# Patient Record
Sex: MALE | Race: BLACK OR AFRICAN AMERICAN | NOT HISPANIC OR LATINO | URBAN - METROPOLITAN AREA
[De-identification: names, ages, dates, MRNs, and addresses within clinical notes are randomized per-mention and may not be internally consistent; named-entity substitution may affect disease eponyms.]

---

## 2017-03-02 ENCOUNTER — OFFICE VISIT - HEALTHEAST (OUTPATIENT)
Dept: ADDICTION MEDICINE | Facility: CLINIC | Age: 37
End: 2017-03-02

## 2017-03-02 DIAGNOSIS — Z03.89 NO DIAGNOSIS ON AXIS I: ICD-10-CM

## 2017-03-10 ENCOUNTER — COMMUNICATION - HEALTHEAST (OUTPATIENT)
Dept: ADDICTION MEDICINE | Facility: CLINIC | Age: 37
End: 2017-03-10

## 2021-06-02 ENCOUNTER — RECORDS - HEALTHEAST (OUTPATIENT)
Dept: ADMINISTRATIVE | Facility: CLINIC | Age: 41
End: 2021-06-02

## 2021-06-09 NOTE — PROGRESS NOTES
Rule 25 Assessment  Background Information   1. Date of Assessment Request  2. Date of Assessment  3/2/17 3. Date Service Authorized     4.   DAT Naik  5.  Phone Number [phone]  6. Referent  [facility] probation  7. Assessment Site  [facility]     8. Client Name Pj Costello 9. Date of Birth  1980 Age  36 y.o. 10. Gender  male  11. PMI/ Insurance No.     12. Client's Primary Language:  English 13. Do you require special accommodations, such as an  or assistance with written material? No   14. Current Address: [address] [place] [address]   15. Client Phone Numbers: [phone] (home)    16.  Alternate (cell) Phone Number:       17. Tell me what has happened to bring you here today.     Client arrived to an outpatient chemical health assessment.   Client reports currently being on probation, states that they requested he complete another assessment.   Client reports having an alcohol related case was to complete a chemical health assessment.       18. Have you had other rule 25 assessments? yes, when, where, and what circumstances:  2015, [facility], no recommendation.     DIMENSION I - Acute Intoxication /Withdrawal Potential   1. Chemical use most recent 12 months outside a facility and other significant use history (client self-report)             X = Primary Drug Used   Age of First Use Most Recent Pattern of Use and Duration   Need enough information to show pattern (both frequency and amounts) and to show tolerance for each chemical that has a diagnosis   Date of last use and time, if needed   Withdrawal Potential? Requiring special care Method of use  (oral, smoked, snort, IV, etc)   [x] Alcohol 23 Every few months, 1-2 beers; reports at time of incident had had 6 beers  08/2016 none oral   [] Marijuana/Hashish  Denies use      [] Cocaine/Crack  Denies use      [] Meth/Amphetamines  Denies use      [] Heroin  Denies use       [] Other Opiates/Synthetics  Denies use      [] Inhalants  Denies use      [] Benzodiazepines  Denies use      [] Hallucinogens  Denies use      [] Barbiturates/Sedatives/Hypnotics  Denies use      [] Over-the-Counter Drugs  Denies use      [] Other  Denies use      [] Nicotine 19 2-3 cigaretters per day  3/2/17  smoke     2. Do you use greater amounts of alcohol/other drugs to feel intoxicated or achieve the desired effect? no.  Or use the same amount and get less of an effect? No (DSM)  Example: client denies tolerance    3A. Have you ever been to detox? no    3B. When was the first time? N/A     3C. How many times since then? N/A     3D. Date of most recent detox: N/A       4.  Withdrawal symptoms: Have you had any of the following withdrawal symptoms?  no  Past 12 months Recent (past 30 days)   None None     Notes:      's Visual Observations and Symptoms: calm and cooperative, oriented 3x  Based on the above information, is withdrawal likely to require attention as part of treatment participation?  no    Dimension I Ratings   Acute intoxication/Withdrawal potential - The placing authority must use the criteria in Dimension I to determine a client s acute intoxication and withdrawal potential.    RISK DESCRIPTIONS - Severity ratin  Client displays full functioning with good ability to tolerate and cope with withdrawal discomfort.  No signs or symptoms of intoxication or withdrawal or resolving signs or symptoms    REASONS SEVERITY WAS ASSIGNED (What about the amount of the person s use and date of most recent use and history of withdrawal problems suggests the potential of withdrawal symptoms requiring professional assistance? )    Client reports date of last use of alcohol in 2016, but collateral indicates more recent use in 10/2016. Client reports most recent pattern of use has been 1-2 beers every few months. Client reports no withdrawal symptoms in the past year, and does not display any  withdrawal concerns at time of assessment.        DIMENSION II - Biomedical Complications and Conditions   1. Do you have any current health/medical conditions?(Include any infectious diseases, allergies, or chronic or acute pain, history of chronic conditions)    No current health concerns.     2. Do you have a health care provider? When was your most recent appointment? What concerns were identified?    [facility], last appt was 2016, physical     3. If indicated by answers to items 1 or 2: How do you deal with these concerns? Is that working for you? If you are not receiving care for this problem, why not?    N/A     4A. List current medication(s) including over-the-counter or herbal supplements--including pain management:    No current medications.     4B. Do you follow current medical recommendations/take medications as prescribed?  N/A     4C. When did you last take your medication?  N/A     5. Has a health care provider/healer ever recommended that you reduce or quit alcohol/drug use?  No (DSM)    6. Are you pregnant?  N/A       6B.  Receiving prenatal care? N/A       6C.  When is your baby due?  N/A     7. Have you had any injuries, assaults/violence towards you, accidents, health related issues, overdose(s) or hospitalizations related to your use of alcohol or other drugs:  no    8. Do you have any specific physical needs/accommodations? no    Dimension II Ratings   Biomedical Conditions and Complications - The placing authority must use the criteria in Dimension II to determine a client s biomedical conditions and complications.   RISK DESCRIPTIONS - Severity ratin  Client displays full functioning with good ability to cope with physical discomfort.    REASONS SEVERITY WAS ASSIGNED (What physical/medical problems does this person have that would inhibit his or her ability to participate in treatment? What issues does he or she have that require assistance to address?)    Client  reports no current physical health concerns, has a primary care physician.          DIMENSION III - Emotional, Behavioral, Cognitive Conditions and Complications   1. (Optional) Tell me what it was like growing up in your family. (substance use, mental health, discipline, abuse, support)    Reports no concerns.     2.  When was the last time that you had significant problems  Past month 2-12 months ago 1+ years ago Never   A. With feeling very trapped, lonely, sad, blue, depressed or hopeless about the future? []  [x]  [] []     B. With sleep trouble, such as bad dreams, sleeping restlessly, or falling asleep during the day? [] [] [] [x]   C. With feeling very anxious, nervous, tense, scared, panicked, or like something bad was going to happen? [] [] [] [x]   D. With becoming very distressed and upset when something reminded you of the past? [] [] [] [x]   E. With thinking about ending your life or committing suicide?  [] [] [] [x]     3.  When was the last time that you did the following things two or more times? Past month 2-12 months ago 1+ years ago Never   A. Lied or conned to get things you wanted or to avoid having to do something? [] [] [] [x]   B. Had a hard time paying attention at school, work, or home? [] [] [] [x]   C. Had a hard time listening to instructions at school, work, or home?  [] [] [] [x]   D. Were a bully or threatened other people? [] [] [] [x]   E. Started physical fights with other people? [] [] [] [x]     Note: These questions are from the Global Appraisal of Individual Needs--Short Screener. Any item marked  past month  or  2 to 12 months ago  will be scored with a severity rating of at least 2.  For each item that has occurred in the past month or past year ask follow up questions to determine how often the person has felt this way or has the behavior occurred? How recently? How has it affected their daily living? And, whether they were using or in withdrawal at the time?    2A: Death  of family members    4A. If the person has answered item 2E with  in the past year  or  the past month , ask about frequency and history of suicide in the family or someone close and whether they were under the influence.  Any history of suicide in your family? Or someone close to you?        4B. If the person answered item 2E  in the past month  ask about  intent, plan, means and access and any other follow-up information  to determine imminent risk. Document any actions taken to intervene  on any identified imminent risk.     Client denies any current SI/SIB     5A. Have you ever been diagnosed with a mental health problem?  Yes, PTSD, anxiety, depression--reports diagnoses were a long time ago, no current concerns   5B. Are you receiving care for any mental health issues? yes  If yes, what is the focus of that care or treatment?  Are you satisfied with the service?  Most recent appointment?  How has it been helpful?    JOANA, 1x per month, has been working with her 5 months, happy with services     6A.  Have you been prescribed medications for emotional/psychological problems?  no  6B.  Current mental health medication(s) If these medications are listed for Dimension II, reference item II-5.  6C.  Are you taking your medications as instructed? N/A     7A. Does your MH provider know about your use?  N/A   7B.  What does he or she have to say about it? (DSM)  N/A     8A. Have you ever been verbally, emotionally, physically or sexually abused? no   Follow up questions to learn current risk, continuing emotional impact.  N/A   8B. Have you received counseling for abuse?  N/A     9A. Have you ever experienced or been part of a group that experienced community violence, historical trauma, rape or assault? no  9B.  How has that affected you?  N/A   9C.  Have you received counseling for that? N/A     10A. [place]: no  10B.  Exposure to Combat?  no    11. Do you have problems with any of the following things in your daily  life?  Headaches and In relationships with others      Note: If the person has any of the above problems, how do they deal with them, have they developed coping mechanisms?  Have they received treatment?  Follow up with items 12, 13, and 14. If none of the issues in item 11 are a problem for the person, skip to item 15.    Will take over the counter medication for headaches.  When experiencing problems with other people, make boundaries, or stay away     12. Have you been diagnosed with traumatic brain injury or Alzheimer s?  no    13.  If the answer to #12 is no, ask the following questions:    Have you ever hit your head or been hit on the head? no    Were you ever seen in the Emergency Room, hospital, or by a doctor because of an injury to your head? no    Have you had any significant illness that affected your brain (brain tumor, meningitis, West Nile Virus, stroke or seizure, heart attack, near drowning or near suffocation)?  no    14.  If the answer to # 12 is yes, ask if any of the problems identified in #11 occurred since the head injury or loss of oxygen N/A     15A. Highest grade of school completed: Certificate at [employer]  15B. Do you have a learning disability? no  15C. Did you ever have tutoring in Math or English? no.  15D. Have you ever been diagnosed with Fetal Alcohol Effects or Fetal Alcohol Syndrome? no    Explain:      16. If yes to item 15 B, C, or D: How has this affected your use or been affected by your use?     N/A     Dimension III Ratings   Emotional/Behavioral/Cognitive - The placing authority must use the criteria in Dimension III to determine a client s emotional, behavioral, and cognitive conditions and complications.   RISK DESCRIPTIONS - Severity ratin  Client has good impulse control and coping skills and presents no risk of harm to self or others.  Client functions in all life areas and displays no emotional, behavioral. or cognitive problems or the  "problems are stable.    REASONS SEVERITY WAS ASSIGNED - What current issues might with thinking, feelings or behavior pose barriers to participation in a treatment program? What coping skills or other assets does the person have to offset those issues? Are these problems that can be initially accommodated by a treatment provider? If not, what specialized skills or attributes must a provider have?    Client reports history of mental health diagnoses of anxiety, depression and PTSD, but reports that those diagnoses were a long time ago, no current concerns. Client reports having a , happy with services. Client reports stressor in the past year related to relatives' deaths. Client denies any current SI/SIB.          DIMENSION IV - Readiness for Change   1. You ve told me what brought you here today. (first section) What do you think the problem really is? Updating assessment for probation.     2. Tell me how things are going. Ask enough questions to determine whether the person has use related problems or assets that can be built upon in the following areas: Family/friends/relationships; Legal; Financial; Emotional; Educational; Recreational/ leisure; Vocational/employment; Living arrangements (DSM)     \"Awesome\"  Will be off of probation shortly, on probation for disorderly conduct.   Has an apartment, lives alone.   Has kids, who live grandmother and mother, sees them on weekends.   Working full-time.     3. What activities have you engaged in when using alcohol/other drugs that could be hazardous to you or others (i.e. driving a car/motorcycle/boat, operating machinery, unsafe sex, sharing needles for drugs or tattoos, etc     Reports none     4. How much time do you spend getting, using or getting over using alcohol or drugs? (DSM)     Sporadic     5. Reasons for drinking/drug use (Use the space below to record answers. It may not be necessary to ask each item.)  Like the feeling    Trying to forget " problems    To cope with stress    To relieve physical pain    To cope with anxiety    To cope with depression    To relax or unwind    Makes it easier to talk with people    Partner encourages use    Most friends drink or use    To cope with family problems    Afraid of withdrawal symptoms/to feel better    Other (specify) Social      A. What concerns other people about your alcohol or drug use/Has anyone told you that you use too much? What did they say? (DSM)    No one has expressed concerns    B. What did you think about that/ do you think you have a problem with alcohol or drug use?     Isn't a problem.     6. What changes are you willing to make? What substance are you willing to stop using? How are you going to do that? Have you tried that before? What interfered with your success with that goal?     Doesn't plan to make any changes, plans to not get into an trouble in the future.     7. What would be helpful to you in making this change?     Nothing additional     Dimension IV Ratings   Readiness for Change - The placing authority must use the criteria in Dimension IV to determine a client s readiness for change.   RISK DESCRIPTIONS - Severity ratin  Clinet is motivated with active reinforcement, to explore treatment and strategies for change, but ambivalent about illness or need for change.    REASONS SEVERITY WAS ASSIGNED - (What information did the person provide that supports your assessment of his or her readiness to change? How aware is the person of problems caused by continued use? How willing is she or he to make changes? What does the person feel would be helpful? What has the person been able to do without help?)    Client reports that he is completing this assessment for probation, appears primarily motivated by legal concerns. Client reports abstinence since 2016, but collateral indicates a more recent incident in 10/2016, which was was not reported by client.          DIMENSION V -  Relapse, Continued Use, and Continued Problem Potential   1. In what ways have you tried to control, cut-down or quit your use? If you have had periods of sobriety, how did you accomplish that? What was helpful? What happened to prevent you from continuing your sobriety? (DSM)     Reports has not tried to cut down or control; does not feel that it has been a problems.     2. Have you experienced cravings? If yes, ask follow up questions to determine if the person recognizes triggers and if the person has had any success in dealing with them.     Does not endorses craving.     3A. Have you been treated for alcohol/other drug abuse/dependence? no  3B.  Number of times (Lifetime) (over what period):  0  3C.  Number of times completed treatment (Lifetime):  0  3D.  During the past 3 years have you participated in outpatient and/or residential?  no  3E.  When and where? N/A   3F.  What was helpful?  What was not? N/A     4. Support group participation: Have you/do you attend support group meetings to reduce/stop your alcohol/drug use? How recently? What was your experience? Are you willing to restart? If the person has not participated, is he or she willing?     No participation.     5. What would assist you in staying sober/straight? Nothing additional     Dimension V Ratings   Relapse/Continued Use/Continued problem potential - The placing authority must use the criteria in Dimension V to determine a client s relapse, continued use, and continued problem potential.   RISK DESCRIPTIONS - Severity ratin  Client recognizes relapse issues and prevention strategies, but displays some vulnerability for the further substance use or mental health problems.    REASONS SEVERITY WAS ASSIGNED - (What information did the person provide that indicates his or her understanding of relapse issues? What about the person s experience indicates how prone he or she is to relapse? What coping skills does the person have that decrease  relapse potential?)     Client reports abstinence since 8/2016, but collateral indicates more recent use, but none in the past three months. Client reports no other attempts of abstinence in the past, no prior treatment services.          DIMENSION VI - Recovery Environment   1. Are you employed/attending school? Tell me about that.     Works full-time.     2A. Describe a typical day; evening for you. Work, school, social, leisure, volunteer, spiritual practices. Include time spent obtaining, using, recovering from drugs or alcohol. (DSM)     Work, Errands before work.     2B. How often do you spend more time than you planned using or use more than you planned? (DSM)     Reports never     3. How important is using to your social connections? Do many of your family or friends use?     Not important.     4A. Are you currently in a significant relationship?  yes  4B.  If yes, how long? 10 years    4C. Sexual Orientation:  Heterosexual     5A. Who do you live with? Lives alone.  5B. Tell me about their alcohol/drug use and mental health issues. N/A   5C. Are you concerned for your safety there? no  5D. Are you concerned about the safety of anyone else who lives with you? N/A     6A. Do you have children who live with you? no  If the person lives with children, ask follow-up questions to determine the person's relationship and responsibility, both legal and care giving; and what arrangements are made for supervision for the children when the person is not available.      6B. Do you have children who do not live with you?  yes, Explain:  two children (8 and 11), live with mother and grandmother, client has partial custody  If yes, ask follow up questions to learn where the children are, who has custody and what the person't relaltionship and responsibility is with these children and what hopes the person has for his or her future with these children.    7A. Who supports you in making changes in your alcohol or drug use?  What are they willing to do to support you? Who is upset or angry about you making changes in your alcohol or drug use? How big a problem is this for you?      No one has been concern, felt there needs to be changes.     7B. This table is provided to record information about the person s relationships and available support It is not necessary to ask each item; only to get a comprehensive picture of their support system.  How often can you count on the following people when you need someone?   Partner / Spouse Always supportive   Parent(s)/Aunt(s)/Uncle(s)/Grandparents Always supportive   Sibling(s)/Cousin(s) Always supportive   Child(bret) Always supportive   Other relative(s) Always supportive   Friend(s)/neighbor(s) Always supportive   Child(bret) s father(s)/mother(s)    Support group member(s)    Community of gisele members    /counselor/therapist/healer Always supportive   Other (specify)      8A. What is your current living situation?     Rents an apartment    8B. What is your long term plan for where you will be living?    Plans to stay for now.     8C. Tell me about your living environment/neighborhood? Ask enough follow up questions to determine safety, criminal activity, availability of alcohol and drugs, supportive or antagonistic to the person making changes.      Likes it, feels safe.     9. Criminal justice history: Gather current/recent history and any significant history related to substance use--Arrests? Convictions? Circumstances? Alcohol or drug involvement? Sentences? Still on probation or parole? Expectations of the court? Current court order? Any sex offenses - lifetime? What level? (DSM)    Currently on probation for disorderly conduct.    10. What obstacles exist to participating in treatment? (Time off work, childcare, funding, transportation, pending [facility] time, living situation)    Work schedule, childcare.     Dimension VI Ratings   Recovery environment - The placing authority  must use the criteria in Dimension VI to determine a client s recovery environment.   RISK DESCRIPTIONS - Severity ratin  Client is engaged in structured, meaningful activity, but peers, family, significant other, living environment are unsupportive, or there is criminal justice involvement by the client or among the client's peers, significatn others, or in the client's environment.    REASONS SEVERITY WAS ASSIGNED - (What support does the person have for making changes? What structure/stability does the person have in his or her daily life that willincrease the likelihood that changes can be sustained? What problems exist in the person s environment that will jeopardize getting/staying clean and sober?)     Client reports working full-times and lives alone. Client reports having supportive relationships and sees his children on weekends. Client reports he is on probation from a disorderly conduct from 2016, but collateral reports more recent charge in 10/2016.          Client Choice/Exceptions     Would you like services specific to language, age, gender, culture, Buddhist preference, race, ethnicity, sexual orientation or disability?  no    If yes, specify:      What particular treatment choices and options would you like to have?  No preference    Do you have a preference for a particular treatment program?  No preference      .    DSM-V Criteria for Substance Abuse  Instructions  Determine whether the client currently meets the criteria for a Substance Use Disorder using the diagnostic criteria in the DSM-V, pp. 481-589. Current means during the most recent 12 months outside a facility that controls access to substances.    Category of substance Severity ICD-10 Code/DSM V Code   []  Alcohol Use Disorder [] Mild  [] Moderate  [] Severe [] (F10.10) (305.00)  [] (F10.20) (303.90)  [] (F10.20) (303.90)   []  Cannabis Use Disorder [] Mild  [] Moderate  [] Severe [] (F12.10) (305.20)  [] (F12.20) (304.30)  []  (F12.20) (304.30)   [] Hallucinogen Use Disorder [] Mild  [] Moderate  [] Severe [] (F16.10) (305.30)  [] (F16.20) (304.50)  [] (F16.20) (304.50)   []  Inhalant Use Disorder [] Mild  [] Moderate  [] Severe [] (F18.10) (305.90)  [] (F18.20) (304.60)  [] (F18.20) (304.60)   []  Opioid Use Disorder [] Mild  [] Moderate  [] Severe [] (F11.10) (305.50)  [] (F11.20) (304.00)  [] (F11.20) (304.00)   []  Sedative, Hypnotic, or Anxiolytic Use Disorder [] Mild  [] Moderate  [] Severe [] (F13.10) (305.40)   [] (F13.20) (304.10)  [] (F13.20) (304.10)   []  Stimulant Related Disorders [] Mild  [] Moderate  [] Severe [] (F15.10) (305.70) Amphetamine type substance  [] (F14.10) (305.60) Cocaine  [] (F15.10) (305.70) Other or unspecified stimulant  [] (F15.20) (304.40) Amphetamine type substance  [] (F14.20) (304.20) Cocaine  [] (F15.20) (304.40) Other or unspecified stimulant  [] (F15.20) (304.40) Amphetamine type substance  [] (F14.20) (304.20) Cocaine  [] (F15.20) (304.40) Other or unspecified stimulant   []  Tobacco use Disorder [] Mild  [] Moderate  [] Severe [] (Z72.0) (305.1)  [] (F17.200) (305.1)  [] (F17.200) (305.1)   []  Other (or unknown) Substance Use Disorder [] Mild  [] Moderate  [] Severe [] (F19.10) (305.90)  [] (F19.20) (304.90)  [] (F19.20) (304.90)       Suggested Level of Care Necessary for Recovery  []  Inpatient  []  Extended Care []  Residential []  Outpatient  [x]  None            Collateral Contact Summary   Number of contacts made:  2  Contact with referring person:  yes     If court related records were reviewed, summarize here:       [x]   Information from collateral contacts supported/largely agreed with information from the client and associated risk ratings.   []   Information from collateral contacts was significantly different from information from the client and lead to different risk ratings.      Summarize new information here:  Collateral indicates more recent use on date of last disorderly  conduct, 10/29/16.     Rule 25 Assessment Summary and Plan   's Recommendation    Using DSM-5 criteria of a problematic pattern of use leading to clinically significant impairment or distress occurring within a 12-month period, client does not currently meet criteria for an Alcohol or Substance Use Disorder, due to no evidence of use since 10/29/16.   As such, no recommendation for treatment services can be made at this time.  This assessment can be updated with additional information within a 6 month period.         Collateral Contacts     Please duplicate this page for each contact.  If this includes information which is sensitive and not public, separate this page from the rest of the assessment before sharing.  Retain the page in the assessment file.   [facility]    Probation Phone Number    [phone] Releases    yes       Information Provided:      3/3/17 3:03pm LVM requesting call back.   On probation for disorderly conduct (10/29/17), sentenced in January, required to complete an assessment.   Went to ex-girlfriend's house, and threatened ex-girlfriend, ex-girlfriends' mother had contacted the police--client was intoxicated at the time.   Client has 5 convictions of disorderly conduct in the past two years  Multiple domestic issues in the past.   In 2009, had a public intoxication charge and DWI.   Client is not currently on any kind of testing.   No concerns on probation so far.      Collateral Contacts     Name    Vaishnavi Arambula   [facility]    [facility]   Phone Number    [phone] Releases    yes       Information Provided:      3/3/17 3:06pm LVM requesting call back.   Has been working with client on housing and mental health.   Client hasn't disclosed any concerns related to alcohol/substances use.   Client has been meeting goals, stable in work.         Staff Name and Title:  DAT Naik      Date: 3/10/2017  Time:  2:18 PM